# Patient Record
Sex: MALE | Race: WHITE | Employment: FULL TIME | URBAN - METROPOLITAN AREA
[De-identification: names, ages, dates, MRNs, and addresses within clinical notes are randomized per-mention and may not be internally consistent; named-entity substitution may affect disease eponyms.]

---

## 2017-08-17 ENCOUNTER — APPOINTMENT (OUTPATIENT)
Dept: GENERAL RADIOLOGY | Age: 52
End: 2017-08-17
Attending: EMERGENCY MEDICINE
Payer: COMMERCIAL

## 2017-08-17 ENCOUNTER — HOSPITAL ENCOUNTER (EMERGENCY)
Age: 52
Discharge: HOME | End: 2017-08-17
Attending: EMERGENCY MEDICINE
Payer: COMMERCIAL

## 2017-08-17 VITALS
RESPIRATION RATE: 16 BRPM | BODY MASS INDEX: 26.68 KG/M2 | HEIGHT: 67 IN | DIASTOLIC BLOOD PRESSURE: 86 MMHG | TEMPERATURE: 99 F | HEART RATE: 85 BPM | OXYGEN SATURATION: 97 % | SYSTOLIC BLOOD PRESSURE: 145 MMHG | WEIGHT: 170 LBS

## 2017-08-17 DIAGNOSIS — S93.601A FOOT SPRAIN, RIGHT, INITIAL ENCOUNTER: Primary | ICD-10-CM

## 2017-08-17 PROCEDURE — 73630 X-RAY EXAM OF FOOT: CPT | Performed by: EMERGENCY MEDICINE

## 2017-08-17 PROCEDURE — 99283 EMERGENCY DEPT VISIT LOW MDM: CPT

## 2017-08-17 NOTE — ED PROVIDER NOTES
Patient Seen in: [facility] Emergency Department In [place]    History   Patient presents with:  Lower Extremity Injury (musculoskeletal)    Stated Complaint: foot injury    HPI    67-year-old male presents emergency room with chief complaint of right sumaya swelling to the right thigh, knee, lower leg, ankle. There is mild tenderness of the lateral aspect of the right foot without discrete tenderness at the base of fifth metatarsal.  No tenderness or deformity to any of the digits of the foot.   No peripheral

## 2019-07-04 ENCOUNTER — OFFICE VISIT (OUTPATIENT)
Dept: FAMILY MEDICINE CLINIC | Facility: CLINIC | Age: 54
End: 2019-07-04
Payer: COMMERCIAL

## 2019-07-04 DIAGNOSIS — Z23 NEED FOR TD VACCINE: Primary | ICD-10-CM

## 2019-07-04 PROCEDURE — 90715 TDAP VACCINE 7 YRS/> IM: CPT | Performed by: NURSE PRACTITIONER

## 2019-07-04 PROCEDURE — 90471 IMMUNIZATION ADMIN: CPT | Performed by: NURSE PRACTITIONER

## 2023-09-15 NOTE — ED INITIAL ASSESSMENT (HPI)
Problem: At Risk for Falls  Goal: # Patient does not fall  Outcome: Outcome Met, Continue evaluating goal progress toward completion  Goal: # Takes action to control fall-related risks  Outcome: Outcome Met, Continue evaluating goal progress toward completion     Problem: Pressure Injury, Risk for  Goal: No new pressure injury (PI) development  Outcome: Outcome Met, Continue evaluating goal progress toward completion     Problem: Pressure Injury Actual  Goal: # No deterioration in pressure injury (PI)  Outcome: Outcome Met, Continue evaluating goal progress toward completion     Problem: VTE, Risk for  Goal: # No s/s of VTE  Outcome: Outcome Met, Continue evaluating goal progress toward completion      STEPPED ON UNEVEN GROUND AND INJURIED R FOOT

## 2024-07-11 ENCOUNTER — OFFICE VISIT (OUTPATIENT)
Dept: FAMILY MEDICINE CLINIC | Facility: CLINIC | Age: 59
End: 2024-07-11
Payer: COMMERCIAL

## 2024-07-11 VITALS
WEIGHT: 165 LBS | DIASTOLIC BLOOD PRESSURE: 70 MMHG | HEIGHT: 67.75 IN | BODY MASS INDEX: 25.3 KG/M2 | HEART RATE: 77 BPM | OXYGEN SATURATION: 97 % | SYSTOLIC BLOOD PRESSURE: 110 MMHG | RESPIRATION RATE: 20 BRPM

## 2024-07-11 DIAGNOSIS — Z12.11 COLON CANCER SCREENING: ICD-10-CM

## 2024-07-11 DIAGNOSIS — Z00.00 ANNUAL PHYSICAL EXAM: Primary | ICD-10-CM

## 2024-07-11 DIAGNOSIS — E55.9 VITAMIN D DEFICIENCY: ICD-10-CM

## 2024-07-11 PROCEDURE — 99386 PREV VISIT NEW AGE 40-64: CPT | Performed by: FAMILY MEDICINE

## 2024-07-30 NOTE — H&P
Chan Gonzalez is a 59 year old male who presents for a complete physical exam.   HPI:   Pt complains of 1. Annual physical exam  - PSA Total, Screen; Future  - Vitamin D; Future  - CBC With Differential With Platelet; Future  - Comp Metabolic Panel (14); Future  - Lipid Panel; Future  - TSH W Reflex To Free T4; Future  - PSA - Total [5363][Q]; Future  - VITAMIN D, 25-HYDROXY [44407][Q]; Future  - CBC With Differential With Platelet  - Comp Metabolic Panel (14)  - Lipid Panel  - TSH W Reflex To Free T4  - PSA - Total [5363][Q]  - VITAMIN D, 25-HYDROXY [05609][Q]    2. Vitamin D deficiency  - Vitamin D; Future  - VITAMIN D, 25-HYDROXY [47755][Q]; Future  - VITAMIN D, 25-HYDROXY [62632][Q]    3. Colon cancer screening  - [facility] Referral - In Network  .    Colonoscopy:due  No current outpatient medications on file.      History reviewed. No pertinent past medical history.   Past Surgical History:   Procedure Laterality Date    Appendectomy        History reviewed. No pertinent family history.   Social History:  Social History     Socioeconomic History    Marital status:    Tobacco Use    Smoking status: Never    Smokeless tobacco: Never         REVIEW OF SYSTEMS:   GENERAL: feels well otherwise  SKIN: denies any unusual skin lesions or rash  EYES:denies blurred vision or double vision  HEENT: denies nasal congestion, sinus pain or ST, denies decreased hearing  LUNGS: denies shortness of breath or frequent cough  CV: denies chest pain, pressure or palpitations  GI: denies abdominal pain, heartburn, chronic diarrhea or constipation  : denies nocturia or changes in stream, denies erectile dysfunction  MS: denies back pain, myalgias or arthralgias  NEURO: denies headaches or dizziness  PSYCH: denies depression or anxiety  HEMATOLOGIC: denies bruising or bleeding easily  ENDOCRINE: denies frequent thirst or urination, denies unintentional weight gain/loss    EXAM:   /70   Pulse 77   Resp 20   Ht 5'  7.75\" (1.721 m)   Wt 165 lb (74.8 kg)   SpO2 97%   BMI 25.27 kg/m²       Body mass index is 25.27 kg/m².     GENERAL: well developed, well nourished,in no apparent distress  SKIN: no rashes,no suspicious lesions  HEENT: atraumatic, normocephalic,TMs clear, posterior pharynx clear  EYES: PERRLA,conjunctiva clear  NECK: supple,no thyromegaly, no adenopathy  LUNGS: clear to auscultation, easy breathing, no cough  CV: S1 S2 noted, RRR, no murmur  GI: active bowel sounds, no rebound/rigidity/tenderness, no HSM  : 2 descended testes, no scrotal mass or tenderness, normal penis no lesions, no hernia  RECTAL: good rectal tone, no masses; prostate smooth, non-tender, non-enlarged, no nodules  MS: Juan, good tone & strength  EXT: no cyanosis, clubbing or edema  NEURO: Alert & oriented x 3, LEs +2DTRs  PSYCH: Mood and affect appropriate    ASSESSMENT AND PLAN:   Chan Gonzalez is a 59 year old male who presents for a complete physical exam. Heart healthy diet, routine exercise, and annual flu vaccines encouraged.  The patient indicates understanding of these issues and agrees to the plan.  Orders Placed This Encounter   Procedures    PSA Total, Screen     Standing Status:   Future     Standing Expiration Date:   7/11/2025    Vitamin D     Standing Status:   Future     Standing Expiration Date:   7/11/2025     Order Specific Question:   Please pick the scenario that best fits the purpose for ordering this test     Answer:   General Screening/Vit D deficiency (25-Hydroxy)     Order Specific Question:   Release to patient     Answer:   Immediate    CBC With Differential With Platelet     Standing Status:   Future     Number of Occurrences:   1     Standing Expiration Date:   7/11/2025    Comp Metabolic Panel (14)     Standing Status:   Future     Number of Occurrences:   1     Standing Expiration Date:   7/11/2025    Lipid Panel     Standing Status:   Future     Number of Occurrences:   1     Standing Expiration Date:    7/11/2025    TSH W Reflex To Free T4     Standing Status:   Future     Number of Occurrences:   1     Standing Expiration Date:   7/11/2025    PSA - Total [5363][Q]     Standing Status:   Future     Number of Occurrences:   1     Standing Expiration Date:   7/11/2025     Order Specific Question:   Release to patient     Answer:   Immediate    VITAMIN D, 25-HYDROXY [60134][Q]     Standing Status:   Future     Number of Occurrences:   1     Standing Expiration Date:   7/11/2025     Order Specific Question:   Release to patient     Answer:   Immediate     Follow up in 1 year.

## 2024-08-15 LAB
ABSOLUTE BASOPHILS: 30 CELLS/UL (ref 0–200)
ABSOLUTE EOSINOPHILS: 132 CELLS/UL (ref 15–500)
ABSOLUTE LYMPHOCYTES: 1193 CELLS/UL (ref 850–3900)
ABSOLUTE MONOCYTES: 203 CELLS/UL (ref 200–950)
ABSOLUTE NEUTROPHILS: 1142 CELLS/UL (ref 1500–7800)
ALBUMIN/GLOBULIN RATIO: 2.1 (CALC) (ref 1–2.5)
ALBUMIN: 4.5 G/DL (ref 3.6–5.1)
ALKALINE PHOSPHATASE: 64 U/L (ref 35–144)
ALT: 26 U/L (ref 9–46)
AST: 25 U/L (ref 10–35)
BASOPHILS: 1.1 %
BILIRUBIN, TOTAL: 0.5 MG/DL (ref 0.2–1.2)
BUN: 7 MG/DL (ref 7–25)
CALCIUM: 9.4 MG/DL (ref 8.6–10.3)
CARBON DIOXIDE: 25 MMOL/L (ref 20–32)
CHLORIDE: 104 MMOL/L (ref 98–110)
CHOL/HDLC RATIO: 4.6 (CALC)
CHOLESTEROL, TOTAL: 188 MG/DL
CREATININE: 0.7 MG/DL (ref 0.7–1.35)
EGFR: 105 ML/MIN/1.73M2
EOSINOPHILS: 4.9 %
GLOBULIN: 2.1 G/DL (CALC) (ref 1.9–3.7)
GLUCOSE: 90 MG/DL (ref 65–99)
HDL CHOLESTEROL: 41 MG/DL
HEMATOCRIT: 45 % (ref 38.5–50)
HEMOGLOBIN: 14.8 G/DL (ref 13.2–17.1)
LDL-CHOLESTEROL: 124 MG/DL (CALC)
LYMPHOCYTES: 44.2 %
MCH: 28.9 PG (ref 27–33)
MCHC: 32.9 G/DL (ref 32–36)
MCV: 87.9 FL (ref 80–100)
MONOCYTES: 7.5 %
MPV: 11.2 FL (ref 7.5–12.5)
NEUTROPHILS: 42.3 %
NON-HDL CHOLESTEROL: 147 MG/DL (CALC)
PLATELET COUNT: 224 THOUSAND/UL (ref 140–400)
POTASSIUM: 4.3 MMOL/L (ref 3.5–5.3)
PROTEIN, TOTAL: 6.6 G/DL (ref 6.1–8.1)
PSA, TOTAL: 2.13 NG/ML
RDW: 13.2 % (ref 11–15)
RED BLOOD CELL COUNT: 5.12 MILLION/UL (ref 4.2–5.8)
SODIUM: 138 MMOL/L (ref 135–146)
TRIGLYCERIDES: 121 MG/DL
TSH W/REFLEX TO FT4: 1.05 MIU/L (ref 0.4–4.5)
VITAMIN D, 25-OH, TOTAL: 10 NG/ML (ref 30–100)
WHITE BLOOD CELL COUNT: 2.7 THOUSAND/UL (ref 3.8–10.8)

## (undated) NOTE — ED AVS SNAPSHOT
Volodymyr Barragan   MRN: [id number]    Department:  THE [facility] Emergency Department in [place]   Date of Visit:  8/17/2017           Disclosure     Insurance plans vary and the physician(s) referred by the ER may not be covered by your plan.  Please contac If you have been prescribed any medication(s), please fill your prescription right away and begin taking the medication(s) as directed    If the emergency physician has read X-rays, these will be re-interpreted by a radiologist.  If there is a significant